# Patient Record
(demographics unavailable — no encounter records)

---

## 2025-02-14 NOTE — HISTORY OF PRESENT ILLNESS
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 Presenting for evaluation of possible JANES as well as progressive shortness of breath. Patient states that he does fall asleep easily and feels tired during the daytime. Does not know if he snores and has no known apneas that he is aware of. He has felt progressively over the last few months that the has to move slower. Denies any known hx of lung disease and no pets in the home. [ESS] : 11

## 2025-02-14 NOTE — ASSESSMENT
[FreeTextEntry1] : 68 year old with 911 exposure presenting for progressive shortness of breath and evaluation for JANES.    Data reviewed: PFT 02/12/2025: FVC (120%), FEV1 (121%), FEV1/FVC (77), TLC (120%), RV (116%), DLCO (80%)- Normal spirometry with no fixed obstruction. Lung volumes increased with mild air trapping. Normal diffusion CT chest 08/2024- Normal appearing lung tissue with mild bronchiectasis. Stippling of the pleura in the apex.   Shortness of breath Snoring Daytime sleepiness  Patient does have clinical signs and symptoms compatible with JANES. Discussed medical risk factors associated with moderate to severe JANES including HTN, CAD, stroke among others. Discussed treatment options including PAP therapy, oral appliance therapy, and inspire. Plan to obtain sleep study. PFT does suggest some mild hyperinflation. Will plan to obtain CT with inspiratory and expiratory images to evaluate for restrictive bronchiolitis as cause of PFT and symptoms.  - Sleep study ordered - Inspiratory and expiratory images to evaluate for restrictive bronchiolitis given PFT findings.  RCT after sleep study and CT

## 2025-02-14 NOTE — ASSESSMENT
[FreeTextEntry1] : 68 year old with 911 exposure presenting for progressive shortness of breath and evaluation for JANES.    Data reviewed: PFT 02/12/2025: FVC (120%), FEV1 (121%), FEV1/FVC (77), TLC (120%), RV (116%), DLCO (80%)- Normal spirometry with no fixed obstruction. Lung volumes increased with mild air trapping. Normal diffusion CT chest 08/2024- Normal appearing lung tissue with mild bronchiectasis. Stippling of the pleura in the apex.   Shortness of breath Snoring Daytime sleepiness  Patient does have clinical signs and symptoms compatible with JANES. Discussed medical risk factors associated with moderate to severe JANES including HTN, CAD, stroke among others. Discussed treatment options including PAP therapy, oral appliance therapy, and inspire. Plan to obtain sleep study. PFT does suggest some mild hyperinflation. Will plan to obtain CT with inspiratory and expiratory images to evaluate for restrictive bronchiolitis as cause of PFT and symptoms.  - Sleep study ordered - Inspiratory and expiratory images to evaluate for restrictive bronchiolitis given PFT findings.  RCT after sleep study and CT independent